# Patient Record
Sex: MALE | Race: WHITE | NOT HISPANIC OR LATINO | Employment: OTHER | URBAN - METROPOLITAN AREA
[De-identification: names, ages, dates, MRNs, and addresses within clinical notes are randomized per-mention and may not be internally consistent; named-entity substitution may affect disease eponyms.]

---

## 2017-08-11 ENCOUNTER — ALLSCRIPTS OFFICE VISIT (OUTPATIENT)
Dept: OTHER | Facility: OTHER | Age: 82
End: 2017-08-11

## 2017-08-11 DIAGNOSIS — E03.9 HYPOTHYROIDISM: ICD-10-CM

## 2017-10-04 ENCOUNTER — GENERIC CONVERSION - ENCOUNTER (OUTPATIENT)
Dept: OTHER | Facility: OTHER | Age: 82
End: 2017-10-04

## 2017-10-09 ENCOUNTER — GENERIC CONVERSION - ENCOUNTER (OUTPATIENT)
Dept: OTHER | Facility: OTHER | Age: 82
End: 2017-10-09

## 2017-10-24 ENCOUNTER — TRANSCRIBE ORDERS (OUTPATIENT)
Dept: ADMINISTRATIVE | Facility: HOSPITAL | Age: 82
End: 2017-10-24

## 2017-10-24 ENCOUNTER — HOSPITAL ENCOUNTER (OUTPATIENT)
Dept: RADIOLOGY | Facility: HOSPITAL | Age: 82
Discharge: HOME | End: 2017-10-24
Attending: PODIATRIST
Payer: COMMERCIAL

## 2017-10-24 DIAGNOSIS — I82.4Y2 DEEP VEIN THROMBOSIS (DVT) OF PROXIMAL VEIN OF LEFT LOWER EXTREMITY, UNSPECIFIED CHRONICITY (HCC): ICD-10-CM

## 2017-10-24 DIAGNOSIS — M79.89 SWELLING OF LIMB: ICD-10-CM

## 2017-10-24 DIAGNOSIS — R60.1 ANASARCA: ICD-10-CM

## 2017-10-24 DIAGNOSIS — R60.1 ANASARCA: Primary | ICD-10-CM

## 2017-10-24 PROCEDURE — 93971 EXTREMITY STUDY: CPT

## 2017-11-21 ENCOUNTER — GENERIC CONVERSION - ENCOUNTER (OUTPATIENT)
Dept: OTHER | Facility: OTHER | Age: 82
End: 2017-11-21

## 2017-11-23 LAB
T4 FREE SERPL-MCNC: 1.07 NG/DL (ref 0.82–1.77)
TSH SERPL DL<=0.05 MIU/L-ACNC: 5.08 UIU/ML (ref 0.45–4.5)

## 2017-11-28 ENCOUNTER — GENERIC CONVERSION - ENCOUNTER (OUTPATIENT)
Dept: OTHER | Facility: OTHER | Age: 82
End: 2017-11-28

## 2017-12-04 ENCOUNTER — ALLSCRIPTS OFFICE VISIT (OUTPATIENT)
Dept: OTHER | Facility: OTHER | Age: 82
End: 2017-12-04

## 2017-12-12 NOTE — PROGRESS NOTES
Assessment    1  Coronary arteriosclerosis (414 00) (I25 10)   2  Hypothyroidism (244 9) (E03 9)   3  BMI 31 0-31 9,adult (V85 31) (Z68 31)    Plan  Coronary arteriosclerosis    · AmLODIPine Besylate 5 MG Oral Tablet; TAKE 1 TABLET TWICE DAILY   · Aspir-81 81 MG Oral Tablet Delayed Release   · HydrALAZINE HCl - 10 MG Oral Tablet   · Toprol XL 50 MG Oral Tablet Extended Release 24 Hour (Metoprolol SuccinateER); 1 qd  Hypothyroidism    · Levothyroxine Sodium 112 MCG Oral Tablet; take 1 tablet by mouth once daily  Status post angioplasty    · Plavix 75 MG Oral Tablet (Clopidogrel Bisulfate); Take 1 tablet daily  Unlinked    · HydroCHLOROthiazide 25 MG Oral Tablet    Discussion/Summary  Possible side effects of new medications were reviewed with the patient/guardian today  The treatment plan was reviewed with the patient/guardian  The patient/guardian understands and agrees with the treatment plan      Chief Complaint  Patient here for review of recent labs  bh/lpn      Active Problems  1  Controlled diabetes mellitus (250 00) (E11 9)   2  Coronary arteriosclerosis (414 00) (I25 10)   3  Hypothyroidism (244 9) (E03 9)   4  Incisional hernia (553 21) (K43 2)   5  Influenza vaccination administered during current admission (V04 81) (Z23)   6  Medicare annual wellness visit, subsequent (V70 0) (Z00 00)   7  Need for influenza vaccination (V04 81) (Z23)   8  Need for vaccination with 13-polyvalent pneumococcal conjugate vaccine (V03 82) (Z23)   9  Osteoarthrosis of knee (715 36) (M17 10)   10  Status post angioplasty (V45 89) (Z98 62)   11  Wound, open, foot (892 0) (S91 309A)    Surgical History  1  History of Cholecystectomy    Family History  Mother    1  No pertinent family history  Father    2  No pertinent family history    Social History     · Daily caffeinated coffee consumption   · Drinks caffeinated tea   · Former smoker (V15 82) (F12 676)   · No alcohol use    Current Meds   1   AmLODIPine Besylate 5 MG Oral Tablet; TAKE 1 TABLET TWICE DAILY; Therapy: (Recorded:18Kjo1877) to Recorded   2  Aspir-81 81 MG Oral Tablet Delayed Release; Therapy: (Recorded:77Ikz2026) to Recorded   3  HydrALAZINE HCl - 10 MG Oral Tablet; Therapy: (Recorded:72Quo3947) to Recorded   4  HydroCHLOROthiazide 25 MG Oral Tablet; 1 qd; Therapy: 56KOQ2723 to  Requested for: 86WQH4764 Recorded   5  Levothyroxine Sodium 112 MCG Oral Tablet; take 1 tablet by mouth once daily; Therapy: 08DTN2502 to (Evaluate:24Aoj5113)  Requested for: 51AQD8173; Last Rx:52Vtq1287 Ordered   6  Plavix 75 MG Oral Tablet; Take 1 tablet daily; Therapy: (Recorded:29Csj9901) to Recorded   7  Toprol XL 50 MG Oral Tablet Extended Release 24 Hour; 1 qd; Therapy: 80NDW0164 to  Requested for: 32RLA1299 Recorded    Allergies  1  No Known Drug Allergies    Vitals  Vital Signs    Recorded: 85JLC9999 10:34AM   Temperature 97 F   Heart Rate 80   Respiration 12   Systolic 523   Diastolic 62   Height 5 ft 5 in   Weight 192 lb    BMI Calculated 31 95   BSA Calculated 1 94     Results/Data  PHQ-2 Adult Depression Screening 04IRJ1394 10:37AM User, [email or web address]     Test Name Result Flag Reference   PHQ-2 Adult Depression Score 0       Over the last two weeks, how often have you been bothered by any of the following problems? Little interest or pleasure in doing things: Not at all - 0 Feeling down, depressed, or hopeless: Not at all - 0   PHQ-2 Adult Depression Screening Negative       Falls Risk Assessment (Dx Z13 89 Screen for Neurologic Disorder) 78SMY0352 10:37AM User, [email or web address]     Test Name Result Flag Reference   Falls Risk      No falls in the past year       Future Appointments    Date/Time Provider Specialty Site   06/04/2018 10:30 AM PAULA Becker   Family Medicine [address]       Signatures   Electronically signed by : PAULA Silva ; Dec 11 2017  6:37AM EST                       (Author)

## 2018-01-12 NOTE — PROGRESS NOTES
Assessment    1  Medicare annual wellness visit, subsequent (V70 0) (Z00 00)    Discussion/Summary  Impression: Subsequent Annual Wellness Visit  Cardiovascular screening and counseling: the risks and benefits of screening were discussed  Diabetes screening and counseling: the risks and benefits of screening were discussed  Colorectal cancer screening and counseling: counseling was given on ways to eat a high fiber diet  Osteoporosis screening and counseling: counseling was given on obtaining adequate amounts of calcium and vitamin D on a daily basis and counseling was given on the importance of regular weightbearing exercise  Abdominal aortic aneurysm screening and counseling: the risks and benefits of screening were discussed  Glaucoma screening and counseling: screening is current  HIV screening and counseling: screening not indicated  Immunizations: influenza vaccine is up to date this year, the risks and benefits of pneumococcal vaccination were discussed with the patient, hepatitis B vaccination series is not indicated at this time due to the patient's low risk of mari the disease, the risks and benefits of the Zostavax vaccine were discussed with the patient, the risks and benefits of the Td vaccine were discussed with the patient and the risks and benefits of the Tdap vaccine were discussed with the patient  Advance Directive Planning: complete and up to date, he was encouraged to follow-up with me to discuss his questions and/or decisions  Patient Discussion: plan discussed with the patient, plan discussed with the patient's family, follow-up as needed  History of Present Illness  The patient is being seen for the subsequent annual wellness visit  Medicare Screening and Risk Factors   Hospitalizations: no previous hospitalizations     Once per lifetime medicare screening tests: Dr Skip Sharma Cardiologist   Medicare Screening Tests Risk Questions   Abdominal aortic aneurysm risk assessment: none indicated  Osteoporosis risk assessment: none indicated  HIV risk assessment: none indicated  Drug and Alcohol Use: The patient is a former cigarette smoker and 35 years ago  He has smoked for year(s)  The patient reports rare alcohol use  Alcohol concern:   The patient has no concerns about alcohol abuse  He has never used illicit drugs  Diet and Physical Activity: Current diet includes well balanced meals  He exercises daily  Exercise: walking  Mood Disorder and Cognitive Impairment Screening: Anxiety screening was done using 0  Depression screening was done using 0  He denies feeling down, depressed, or hopeless over the past two weeks  He denies feeling little interest or pleasure in doing things over the past two weeks  Cognitive impairment screening: denies difficulty learning/retaining new information, denies difficulty handling complex tasks, denies difficulty with reasoning, denies difficulty with spatial ability and orientation, denies difficulty with language and denies difficulty with behavior  Functional Ability/Level of Safety: Hearing is significantly decreased in the right ear and significantly decreased in the left ear  He does not use a hearing aid  The patient is currently able to do activities of daily living without limitations  Activities of daily living details: does not need help using the phone, no transportation help needed, does not need help shopping, no meal preparation help needed, does not need help doing housework, does not need help doing laundry, does not need help managing medications and does not need help managing money  Advance Directives: Advance directives: living will, durable power of  for health care directives, advance directives and At home     Co-Managers and Medical Equipment/Suppliers: See Patient Care Team      Patient Care Team    Care Team Member Role Specialty Office Number   [address] [place] Juliana Magy  Endocrinology [phone] MD  Ophthalmology [phone]     Review of Systems    Over the past 2 weeks, how often have you been bothered by the following problems? 1 ) Little interest or pleasure in doing things? Not at all    2 ) Feeling down, depressed or hopeless? Not at all    3 ) Trouble falling asleep or sleeping too much? Not at all    4 ) Feeling tired or having little energy? Not at all    5 ) Poor appetite or overeating? Not at all    6 ) Feeling bad about yourself, or that you are a failure, or have let yourself or your family down? Not at all    7 ) Trouble concentrating on things, such as reading a newspaper or watching television? Not at all    8 ) Moving or speaking so slowly that other people could have noticed, or the opposite, moving or speaking faster than usual? Not at all  How difficult have these problems made it for you to do your work, take care of things at home, or get along with people? Not at all  Active Problems    1  Controlled diabetes mellitus (250 00) (E11 9)   2  Coronary arteriosclerosis (414 00) (I25 10)   3  Hypothyroidism (244 9) (E03 9)   4  Incisional hernia (553 21) (K43 2)   5  Influenza vaccination administered during current admission (V04 81) (Z23)   6  Need for influenza vaccination (V04 81) (Z23)   7  Osteoarthritis, knee/lower leg (715 96) (M17 9)   8  Status post angioplasty (V45 89) (Z98 62)   9  Wound, open, foot (892 0) (S91 309A)    Past Medical History    The active problems and past medical history were reviewed and updated today  Surgical History    1  History of Cholecystectomy    The surgical history was reviewed and updated today  Family History  Mother    1  No pertinent family history  Father    2  No pertinent family history    The family history was reviewed and updated today         Social History    · Daily caffeinated coffee consumption   · Drinks caffeinated tea   · Former smoker (D17 26) (P27 967)   · No alcohol use  The social history was reviewed and updated today  Current Meds   1  AmLODIPine Besylate 5 MG Oral Tablet; TAKE 1 TABLET TWICE DAILY; Therapy: (Recorded:03Sep2013) to Recorded   2  Aspir-81 81 MG Oral Tablet Delayed Release; Therapy: (Recorded:11Aug2017) to Recorded   3  HydrALAZINE HCl - 10 MG Oral Tablet; Therapy: (Recorded:11Aug2017) to Recorded   4  HydroCHLOROthiazide 25 MG Oral Tablet; 1 qd; Therapy: 24DBQ1572 to  Requested for: 72XQZ5342 Recorded   5  Levothyroxine Sodium 88 MCG Oral Tablet; 1 qd; Therapy: 89UDL4537 to  Requested for: 66AUO1765 Recorded   6  Plavix 75 MG Oral Tablet; Take 1 tablet daily; Therapy: (Recorded:03Sep2013) to Recorded   7  Toprol XL 50 MG Oral Tablet Extended Release 24 Hour; 1 qd; Therapy: 12JJO8344 to  Requested for: 11JDF6365 Recorded    The medication list was reviewed and updated today  Allergies    1  No Known Drug Allergies    Immunizations  Influenza --- Tank Drummond: 29-Oct-2007  (77y); Series2: 23-Oct-2008  (78y); Series3: 26-Sep-2009   (79y); Series4: 30-Sep-2010  (80y); Series5: 23-Sep-2011  (81y); Series6: 25-Sep-2012  (82y); Series7: 03-Oct-2013  (83y); Series8: 14-Oct-2014  (84y); Series9: 04-Nov-2015  (85y); Edyqib76:  24-Oct-2016  (86y)   Zoster --- Tank Drummond: 13-Aug-2007  (76y)     Vitals  Signs   Recorded: 50QKE7629 03:38PM   Temperature: 98 F  Heart Rate: 76  Respiration: 12  Systolic: 911  Diastolic: 68  Height: 5 ft 5 in  Weight: 197 lb   BMI Calculated: 32 78  BSA Calculated: 1 97    Procedure    Procedure: Visual Acuity Test    Indication: routine screening  Inforrmation supplied by a Snellen chart  Results: 20/25 in both eyes without corrective device, 20/25 in the right eye without corrective device, 20/25 in the left eye without corrective device   Color vision was screened with the [place] Test and the results were        Signatures   Electronically signed by : PAULA Jang ; Aug 13 2017 11: 05PM EST                       (Author)

## 2018-01-14 VITALS
HEART RATE: 76 BPM | TEMPERATURE: 98 F | RESPIRATION RATE: 12 BRPM | BODY MASS INDEX: 32.82 KG/M2 | DIASTOLIC BLOOD PRESSURE: 68 MMHG | SYSTOLIC BLOOD PRESSURE: 124 MMHG | WEIGHT: 197 LBS | HEIGHT: 65 IN

## 2018-01-16 NOTE — RESULT NOTES
Verified Results  (LC) TSH Rfx on Abnormal to Free T4 17XQE6137 12:22PM Yajaira Click     Test Name Result Flag Reference   TSH 5 080 uIU/mL H 0 450-4 500   T4,Free (Direct) 1 07 ng/dL  0 82-1 77       Plan  Hypothyroidism    · Levothyroxine Sodium 100 MCG Oral Tablet   · Levothyroxine Sodium 112 MCG Oral Tablet; take 1 tablet by mouth once daily  Unlinked    · Levothyroxine Sodium 88 MCG Oral Tablet

## 2018-01-23 VITALS
WEIGHT: 192 LBS | DIASTOLIC BLOOD PRESSURE: 62 MMHG | HEIGHT: 65 IN | HEART RATE: 80 BPM | SYSTOLIC BLOOD PRESSURE: 124 MMHG | RESPIRATION RATE: 12 BRPM | BODY MASS INDEX: 31.99 KG/M2 | TEMPERATURE: 97 F

## 2018-02-13 DIAGNOSIS — E03.9 HYPOTHYROIDISM, UNSPECIFIED TYPE: Primary | ICD-10-CM

## 2018-02-14 RX ORDER — LEVOTHYROXINE SODIUM 112 UG/1
112 TABLET ORAL DAILY
Qty: 90 TABLET | Refills: 0 | Status: SHIPPED | OUTPATIENT
Start: 2018-02-14 | End: 2018-05-15 | Stop reason: SDUPTHER

## 2018-04-17 LAB — HBA1C MFR BLD HPLC: 6.5 %

## 2018-05-15 DIAGNOSIS — E03.9 HYPOTHYROIDISM, UNSPECIFIED TYPE: ICD-10-CM

## 2018-05-16 RX ORDER — LEVOTHYROXINE SODIUM 112 UG/1
112 TABLET ORAL DAILY
Qty: 90 TABLET | Refills: 0 | Status: SHIPPED | OUTPATIENT
Start: 2018-05-16 | End: 2018-05-19 | Stop reason: SDUPTHER

## 2018-05-19 DIAGNOSIS — E03.9 HYPOTHYROIDISM, UNSPECIFIED TYPE: ICD-10-CM

## 2018-05-19 RX ORDER — LEVOTHYROXINE SODIUM 112 UG/1
TABLET ORAL
Qty: 90 TABLET | Refills: 0 | Status: SHIPPED | OUTPATIENT
Start: 2018-05-19 | End: 2018-09-17 | Stop reason: SDUPTHER

## 2018-06-04 ENCOUNTER — OFFICE VISIT (OUTPATIENT)
Dept: FAMILY MEDICINE CLINIC | Facility: CLINIC | Age: 83
End: 2018-06-04
Payer: COMMERCIAL

## 2018-06-04 VITALS
RESPIRATION RATE: 18 BRPM | SYSTOLIC BLOOD PRESSURE: 110 MMHG | HEIGHT: 67 IN | WEIGHT: 180 LBS | DIASTOLIC BLOOD PRESSURE: 60 MMHG | BODY MASS INDEX: 28.25 KG/M2 | HEART RATE: 80 BPM

## 2018-06-04 DIAGNOSIS — I10 HYPERTENSION, UNSPECIFIED TYPE: Primary | ICD-10-CM

## 2018-06-04 DIAGNOSIS — E03.9 HYPOTHYROIDISM, UNSPECIFIED TYPE: ICD-10-CM

## 2018-06-04 DIAGNOSIS — E11.22 CONTROLLED TYPE 2 DIABETES MELLITUS WITH CHRONIC KIDNEY DISEASE, WITHOUT LONG-TERM CURRENT USE OF INSULIN, UNSPECIFIED CKD STAGE (HCC): ICD-10-CM

## 2018-06-04 DIAGNOSIS — N18.9 CHRONIC KIDNEY DISEASE, UNSPECIFIED CKD STAGE: ICD-10-CM

## 2018-06-04 PROCEDURE — 1160F RVW MEDS BY RX/DR IN RCRD: CPT | Performed by: FAMILY MEDICINE

## 2018-06-04 PROCEDURE — 3725F SCREEN DEPRESSION PERFORMED: CPT | Performed by: FAMILY MEDICINE

## 2018-06-04 PROCEDURE — 1036F TOBACCO NON-USER: CPT | Performed by: FAMILY MEDICINE

## 2018-06-04 PROCEDURE — 99214 OFFICE O/P EST MOD 30 MIN: CPT | Performed by: FAMILY MEDICINE

## 2018-06-04 PROCEDURE — 1101F PT FALLS ASSESS-DOCD LE1/YR: CPT | Performed by: FAMILY MEDICINE

## 2018-06-04 RX ORDER — CLOPIDOGREL BISULFATE 75 MG/1
TABLET ORAL
COMMUNITY
Start: 2018-04-24

## 2018-06-04 RX ORDER — METOPROLOL SUCCINATE 50 MG/1
TABLET, EXTENDED RELEASE ORAL
COMMUNITY
Start: 2018-05-02

## 2018-06-04 RX ORDER — GLUCOSAMINE HCL 500 MG
1000 TABLET ORAL
COMMUNITY

## 2018-06-04 RX ORDER — GLUCOSAM/CHON-MSM1/C/MANG/BOSW 500-416.6
TABLET ORAL
COMMUNITY
Start: 2018-04-20

## 2018-06-04 RX ORDER — SIMVASTATIN 10 MG
TABLET ORAL
COMMUNITY
Start: 2018-05-02

## 2018-06-04 RX ORDER — BLOOD SUGAR DIAGNOSTIC
STRIP MISCELLANEOUS
COMMUNITY
Start: 2018-04-24

## 2018-06-04 RX ORDER — LOSARTAN POTASSIUM 25 MG/1
25 TABLET ORAL
COMMUNITY
End: 2019-11-08 | Stop reason: SDUPTHER

## 2018-06-04 RX ORDER — HYDRALAZINE HYDROCHLORIDE 10 MG/1
10 TABLET, FILM COATED ORAL
COMMUNITY

## 2018-06-04 RX ORDER — AMLODIPINE BESYLATE 5 MG/1
TABLET ORAL
COMMUNITY
Start: 2018-04-11

## 2018-06-04 RX ORDER — TORSEMIDE 20 MG/1
TABLET ORAL
COMMUNITY
Start: 2018-05-08

## 2018-06-04 NOTE — PROGRESS NOTES
Assessment/Plan:  Diagnoses and all orders for this visit:    Hypertension, unspecified type  Comments:  BP low end normal-pt at times symptomaitic-t/c dec torsemide-pt will d/w cardio  at upcoming visit  Chronic kidney disease, unspecified CKD stage  Comments:  further inc in creat noted on last lab-t/c dec diuretic  Hypothyroidism, unspecified type  Comments:  cont  levothyroxine  Controlled type 2 diabetes mellitus with chronic kidney disease, without long-term current use of insulin, unspecified CKD stage ([facility] [address] )  Comments:  last NuD3O=1 5% in April  Cont t monitor  check yearly eye exam-Dr Claudia Dunham    t/c Shingrix vacc-will check w cardio prior t admin  Other orders  -     amLODIPine (NORVASC) 5 mg tablet;   -     aspirin 81 MG tablet; Take by mouth  -     Cholecalciferol (VITAMIN D3) 3000 units TABS; Take 1,000 Units by mouth  -     clopidogrel (PLAVIX) 75 mg tablet;   -     ACCU-CHEK ONEL PLUS test strip;   -     hydrALAZINE (APRESOLINE) 10 mg tablet; Take 10 mg by mouth  -     TRUEPLUS LANCETS 33G MISC;   -     losartan (COZAAR) 25 mg tablet; Take 25 mg by mouth  -     metoprolol succinate (TOPROL-XL) 50 mg 24 hr tablet;   -     simvastatin (ZOCOR) 10 mg tablet;   -     torsemide (DEMADEX) 20 mg tablet;   -     MAGNESIUM OXIDE PO; Take 400 mg by mouth                   Subjective:      Patient ID: Rita Byrne is a 80 y o  male  Chief Complaint   Patient presents with    Follow-up     6mth check hypothyroid,CAD       81 yo pt in for BP check and follow-up  BP low end normal-sometimes feels tired, dizzy-t/c dec diuretic-pt will d/w cardio-Dr Za Rudolph at upcoming visit  Vaccs UTD - t/c Shingrix-will d/w ok from [facility] prior to admin  UTD w eye-dr Roberts  Brought in labs done 5/29/18--noted inc BUN/creat,-?sec to change of diuretic to torsemide  YLA=805 8  Prior lab 4/17/18  BUN/creat/=37/1  95  LeY4P=6 5%  TSH=1 760  Lipidswnl except low HDL=37        The following portions of the patient's history were reviewed and updated as appropriate: allergies, current medications, past family history, past medical history, past social history, past surgical history and problem list      Review of Systems   Constitutional: Positive for fatigue  Negative for fever  Respiratory: Positive for chest tightness  Negative for shortness of breath  Cardiovascular: Negative for chest pain and palpitations  Hx CAD/HTN   Gastrointestinal:        Hx diverticular disease  Hx hemicolectomy     Genitourinary: Negative  Musculoskeletal: Positive for arthralgias and myalgias  Neurological: Negative  Psychiatric/Behavioral: Positive for sleep disturbance  Objective:    /60 (BP Location: Left arm, Patient Position: Sitting, Cuff Size: Standard)   Pulse 80   Resp 18   Ht 5' 7" (1 702 m)   Wt 81 6 kg (180 lb)   BMI 28 19 kg/m²        Physical Exam   Constitutional: He is oriented to person, place, and time  He appears well-developed and well-nourished  No distress  Chronically ill   HENT:   Mouth/Throat: No oropharyngeal exudate  Eyes: Conjunctivae are normal    Neck: Neck supple  Cardiovascular: Normal rate and regular rhythm  Murmur heard  Pulmonary/Chest: Effort normal and breath sounds normal    Abdominal: Soft  Bowel sounds are normal  There is no tenderness  Musculoskeletal: Normal range of motion  Neurological: He is alert and oriented to person, place, and time  No cranial nerve deficit  Skin: Skin is warm and dry  Psychiatric: He has a normal mood and affect  Nursing note and vitals reviewed  Labs;  Labs in chart were reviewed        Radha Moreland MD

## 2018-06-06 PROBLEM — N18.9 CHRONIC KIDNEY DISEASE: Status: ACTIVE | Noted: 2018-01-31

## 2018-06-06 PROBLEM — Z87.19 H/O DIVERTICULITIS OF COLON: Status: ACTIVE | Noted: 2018-01-31

## 2018-06-06 PROBLEM — K86.2 PANCREATIC CYST: Status: ACTIVE | Noted: 2018-01-31

## 2018-06-06 PROBLEM — I10 HYPERTENSION: Status: ACTIVE | Noted: 2018-01-31

## 2018-06-06 PROBLEM — Z90.49 S/P RIGHT HEMICOLECTOMY: Status: ACTIVE | Noted: 2018-01-31

## 2018-09-17 DIAGNOSIS — E03.9 HYPOTHYROIDISM, UNSPECIFIED TYPE: ICD-10-CM

## 2018-09-18 RX ORDER — LEVOTHYROXINE SODIUM 112 UG/1
TABLET ORAL
Qty: 90 TABLET | Refills: 0 | Status: SHIPPED | OUTPATIENT
Start: 2018-09-18 | End: 2018-11-19 | Stop reason: SDUPTHER

## 2018-10-18 ENCOUNTER — CLINICAL SUPPORT (OUTPATIENT)
Dept: CARDIAC REHAB | Facility: CLINIC | Age: 83
End: 2018-10-18
Payer: COMMERCIAL

## 2018-10-18 VITALS — HEIGHT: 67 IN | BODY MASS INDEX: 27.44 KG/M2 | WEIGHT: 174.8 LBS

## 2018-10-18 DIAGNOSIS — Z98.61 STATUS POST PERCUTANEOUS TRANSLUMINAL CORONARY ANGIOPLASTY: Primary | ICD-10-CM

## 2018-10-18 PROCEDURE — 93797 PHYS/QHP OP CAR RHAB WO ECG: CPT

## 2018-10-18 NOTE — PROGRESS NOTES
Cardiac Rehabilitation Plan of Care   Initial      Today's date: 10/18/2018   Visits: Initial  Patient name: Tre Reilly      : 1930  Age: 80 y o  MRN: [id number]  Referring Physician: Jerris Fothergill, MD  Provider: Missy Fernández  Clinician: Cher Rodriguez RN    Dx:   Encounter Diagnosis   Name Primary?  Status post percutaneous transluminal coronary angioplasty Yes     Date of onset: 10/5/2018    Comments: Completed initial evaluation    Medication compliance: Yes   Comments: states compliant  Fall Risk: Yes   Comments: chronic right hip pain, short of breath with minimal exertion    EXERCISE/ACTIVITY    Cardiovascular:   Min: 11   METS: 1 9   Hr: 98   RPE: 7   O2 sat: 98    Modalities: Treadmill, AD bike, UBE, NuStep and Recumbent bike  Strength trainin-3 days / week, 12-15 repitations  and 1-2 sets per modality    Modalities: Lateral raise, Arm curl and frontal raise shoulder shrugs upright rows  EKG changes: NSR at rest and with exercise  Dyspnea score: 2  Home activity: none  Goals: increase endurance to walk a longer distance than 650 feet in 6 minutes in 12 weeks without shortness of breath, increase strength  Education: Explained cardiac rehabilitation, cardiac risk factors, how the heart works, six minute walk test, arm curl and chair to stand test, target heart rates  Plan: nu-step 20 minutes level 3, UBE 6 minutes level 2, schwinn arm 5 minutes, increase time and resistance as tolerated     Readiness to change: 10    NUTRITION    Weight control:    Starting weight: 174 8   Current weight: Weight - Scale: 79 3 kg (174 lb 12 8 oz)   Waist circumference:    Startin   Current: Waist circumference (inches): 40 Inches  Diabetes: Patient reported fasting   Lipid management: Simvastatin, low fat diet  Goals: increase fish to twice a week, increase fruits and vegetables to 2 servings a day, beaware of sodium in diet  Education: sodium, rate your plate and 12% low fat diet  Plan:  increase fish to twice a week, increase fruits and vegetables to 2 servings a day, beaware of sodium in diet  Readiness to change: 10    PSYCHOSOCIAL    Emotional: PHQ-9 Total Score: 1  Self-reported stress level: 0   Social support: spouse and two daughters  Goals: continue to be stress free  Education: PHQ9  Plan: continue to be stress free  Readiness to change: 1    OTHER CORE COMPONENTS     Tobacco:   History   Smoking Status    Former Smoker   Smokeless Tobacco    Never Used     Blood pressure:    Resting: Resting BP: 142/60   Exercise:    Goals: decrease salt in diet and take medication as prescribed  Education: sodium  Plan: decrease sodium in diet and take medication as prescribed  Readiness to change: 10

## 2018-10-18 NOTE — PROGRESS NOTES
CARDIAC REHAB ASSESSMENT    Today's date: 2018  Patient name: Ion Pennington     : 1930       MRN: [id number]  PCP: Papito Garcia MD  Referring Physician: Ave Frye MD   Cardiologist: Ave Frye   Surgeon: N/A  Dx:   Encounter Diagnosis   Name Primary?  Status post percutaneous transluminal coronary angioplasty Yes       Date of onset: 10/5/2018  Cultural needs: none    Height:    Wt Readings from Last 1 Encounters:   10/18/18 79 3 kg (174 lb 12 8 oz)      Weight:   Ht Readings from Last 1 Encounters:   10/18/18 5' 7" (1 702 m)     Medical History: No past medical history on file  Physical Limitations: lifting restriction 10 pounds, chronic discomfort in right hip    Risk Factors   Cholesterol: Yes  Smoking: Former user  HTN: Yes  DM: Type 2   diet controlled  Obesity: Yes   Inactivity: Yes  Family History:  Family History   Problem Relation Age of Onset    No Known Problems Mother     No Known Problems Father        Allergies: Patient has no known allergies  ETOH:   History   Alcohol Use No         Current Medications:   Current Outpatient Prescriptions   Medication Sig Dispense Refill    ACCU-CHEK ONEL PLUS test strip       amLODIPine (NORVASC) 5 mg tablet       aspirin 81 MG tablet Take by mouth      Cholecalciferol (VITAMIN D3) 3000 units TABS Take 1,000 Units by mouth      clopidogrel (PLAVIX) 75 mg tablet       hydrALAZINE (APRESOLINE) 10 mg tablet Take 10 mg by mouth      levothyroxine 112 mcg tablet TAKE 1 TABLET EVERY DAY 90 tablet 0    losartan (COZAAR) 25 mg tablet Take 25 mg by mouth      MAGNESIUM OXIDE PO Take 400 mg by mouth      metoprolol succinate (TOPROL-XL) 50 mg 24 hr tablet       simvastatin (ZOCOR) 10 mg tablet       torsemide (DEMADEX) 20 mg tablet       TRUEPLUS LANCETS 33G MISC        No current facility-administered medications for this visit              Functional Status Prior to Diagnosis for Treatment   Occupation: retired  Recreation: reading  ADLs: chronic right hip discomfort, fatigue short of breath angina  [place]: No limitations  Exercise: none  Other: none    Current Functional Status  Occupation: retired  Recreation: reading  ADLs: chronic right hip discomfort, fatigue short of breath angina  [place]: No limitations  Exercise: none  Other: none      Short Term Program Goals: dietary modifications increase fish to twice a week, decrease sodium in diet and increase consumption of fruits and vegetables    Long Term Goals: Increase endurance to walk longer than 650 feet in 6 minutes in 12 weeks, increase energy    Ability to reach goals/rehabilitation potential:  Good    Projected return to function: 12 weeks  Objective tests: sub-max NuStep ETT  6 MWT  sit to stand  arm curl      Nutritional   Reviewed details of Rate your Plate  Discussed key elements of heart healthy eating  Reviewed patient goals for dietary modifications and their clinical implications  Reviewed most recent lipid profile       Goals for dietary modification: increase fish intake  increase fruits and vegetables  low sodium      Emotional/Social  Patient reports he/she is coping well with good social support and denies depression or anxiety    SOCIAL SUPPORT NETWORK    Marital status:     Rate 1-5:    Marriage: 0   Family: 0   Financial: 0   Relationships: 0   Spirituality: 0   Intellectual: 0    Perceived Stress: 0/10   Stressors:remain stress free   Goals for Stress Management:continue to be stress free    Domestic Violence Screening: he stated he was free of harm    Comments: completed initial evaluation

## 2018-10-18 NOTE — PROGRESS NOTES
Aida John   9/12/1930     Risk: moderate     Pre Post % Change Goal   Date: 10/18/18      Physical       Sub Max ETT (mets)nu step 1 9   10% increase   6MWT (feet) 650   10% increase   Arm Curl 20   5 pt decrease   Chair to stands 13      [employer] (est peak O2) 5 07      Peak exercise CR/IN (mets) 1 9   40% increase   Emotional       PHQ9 (> 10 refer to MD) 1   4 pt decrease   [place] (lower score = improvement)       Total 17   < 27   Feelings 1   < 3   Physical Fitness 4   < 3   Social Support 1   < 3   Daily Activities 3   < 3   Social Activities 1   < 3   Pain 1   < 3   Overall Health 3   < 3   Quality of Life 2   < 3   Change in Health 1   < 3   Dietary       Rate your plate 55   > 58   Measurements       Weight 174 5   2 5 - 5%   BMI 27 3   19 - 25   Waist Circ  40   < 40 M / < 35 F   % Body fat 19 2   < 25 M / < 33 F   BP left arm               (systolic) 898   < 562   (diastolic) 60   < 90   Smoking #/day  (if applicable) 0   0   Lipids/Glucose (Date)       Total cholesterol    50 - 200   Triglycerides    < 150   HDL    40 - 60   LDL    < 100   A1C    4 0 - 5 6%   Fasting BG

## 2018-10-19 ENCOUNTER — CLINICAL SUPPORT (OUTPATIENT)
Dept: FAMILY MEDICINE CLINIC | Facility: CLINIC | Age: 83
End: 2018-10-19
Payer: COMMERCIAL

## 2018-10-19 DIAGNOSIS — Z23 NEED FOR INFLUENZA VACCINATION: Primary | ICD-10-CM

## 2018-10-19 PROCEDURE — 90662 IIV NO PRSV INCREASED AG IM: CPT

## 2018-10-19 PROCEDURE — G0008 ADMIN INFLUENZA VIRUS VAC: HCPCS

## 2018-10-22 ENCOUNTER — CLINICAL SUPPORT (OUTPATIENT)
Dept: CARDIAC REHAB | Facility: CLINIC | Age: 83
End: 2018-10-22
Payer: COMMERCIAL

## 2018-10-22 DIAGNOSIS — Z98.61 POSTSURGICAL PERCUTANEOUS TRANSLUMINAL CORONARY ANGIOPLASTY STATUS: ICD-10-CM

## 2018-10-22 PROCEDURE — 93798 PHYS/QHP OP CAR RHAB W/ECG: CPT

## 2018-10-24 ENCOUNTER — CLINICAL SUPPORT (OUTPATIENT)
Dept: CARDIAC REHAB | Facility: CLINIC | Age: 83
End: 2018-10-24
Payer: COMMERCIAL

## 2018-10-24 DIAGNOSIS — Z98.61 POSTSURGICAL PERCUTANEOUS TRANSLUMINAL CORONARY ANGIOPLASTY STATUS: ICD-10-CM

## 2018-10-24 PROCEDURE — 93798 PHYS/QHP OP CAR RHAB W/ECG: CPT

## 2018-10-26 ENCOUNTER — CLINICAL SUPPORT (OUTPATIENT)
Dept: CARDIAC REHAB | Facility: CLINIC | Age: 83
End: 2018-10-26
Payer: COMMERCIAL

## 2018-10-26 DIAGNOSIS — Z98.61 POSTSURGICAL PERCUTANEOUS TRANSLUMINAL CORONARY ANGIOPLASTY STATUS: ICD-10-CM

## 2018-10-26 PROCEDURE — 93798 PHYS/QHP OP CAR RHAB W/ECG: CPT

## 2018-10-29 ENCOUNTER — CLINICAL SUPPORT (OUTPATIENT)
Dept: CARDIAC REHAB | Facility: CLINIC | Age: 83
End: 2018-10-29
Payer: COMMERCIAL

## 2018-10-29 DIAGNOSIS — Z98.61 POSTSURGICAL PERCUTANEOUS TRANSLUMINAL CORONARY ANGIOPLASTY STATUS: ICD-10-CM

## 2018-10-29 DIAGNOSIS — I50.22 CHRONIC SYSTOLIC HEART FAILURE (HCC): ICD-10-CM

## 2018-10-29 PROCEDURE — 93798 PHYS/QHP OP CAR RHAB W/ECG: CPT

## 2018-10-31 ENCOUNTER — CLINICAL SUPPORT (OUTPATIENT)
Dept: CARDIAC REHAB | Facility: CLINIC | Age: 83
End: 2018-10-31
Payer: COMMERCIAL

## 2018-10-31 DIAGNOSIS — Z98.61 POSTSURGICAL PERCUTANEOUS TRANSLUMINAL CORONARY ANGIOPLASTY STATUS: ICD-10-CM

## 2018-10-31 PROCEDURE — 93798 PHYS/QHP OP CAR RHAB W/ECG: CPT

## 2018-11-02 ENCOUNTER — CLINICAL SUPPORT (OUTPATIENT)
Dept: CARDIAC REHAB | Facility: CLINIC | Age: 83
End: 2018-11-02
Payer: COMMERCIAL

## 2018-11-02 DIAGNOSIS — Z98.61 S/P PTCA (PERCUTANEOUS TRANSLUMINAL CORONARY ANGIOPLASTY): ICD-10-CM

## 2018-11-02 PROCEDURE — 93798 PHYS/QHP OP CAR RHAB W/ECG: CPT

## 2018-11-05 ENCOUNTER — TELEPHONE (OUTPATIENT)
Dept: FAMILY MEDICINE CLINIC | Facility: CLINIC | Age: 83
End: 2018-11-05

## 2018-11-05 ENCOUNTER — CLINICAL SUPPORT (OUTPATIENT)
Dept: CARDIAC REHAB | Facility: CLINIC | Age: 83
End: 2018-11-05
Payer: COMMERCIAL

## 2018-11-05 DIAGNOSIS — Z98.61 S/P PTCA (PERCUTANEOUS TRANSLUMINAL CORONARY ANGIOPLASTY): ICD-10-CM

## 2018-11-05 PROCEDURE — 93798 PHYS/QHP OP CAR RHAB W/ECG: CPT

## 2018-11-06 ENCOUNTER — CLINICAL SUPPORT (OUTPATIENT)
Dept: FAMILY MEDICINE CLINIC | Facility: CLINIC | Age: 83
End: 2018-11-06
Payer: COMMERCIAL

## 2018-11-06 DIAGNOSIS — Z23 NEED FOR PNEUMOCOCCAL VACCINATION: Primary | ICD-10-CM

## 2018-11-06 PROCEDURE — G0009 ADMIN PNEUMOCOCCAL VACCINE: HCPCS

## 2018-11-06 PROCEDURE — 90732 PPSV23 VACC 2 YRS+ SUBQ/IM: CPT

## 2018-11-06 PROCEDURE — 4040F PNEUMOC VAC/ADMIN/RCVD: CPT

## 2018-11-06 NOTE — TELEPHONE ENCOUNTER
If pt had prevnar 13 on 8/11/2017 then he can get the pneum 23 if he did not already have done after age 72

## 2018-11-06 NOTE — TELEPHONE ENCOUNTER
Please clarify-per record pt had prevnar 13 8/11/2017    If this is the case then he can get pneum 23 now    The  2 diff pneum vaccines should be 6-12 mths apart

## 2018-11-07 ENCOUNTER — CLINICAL SUPPORT (OUTPATIENT)
Dept: CARDIAC REHAB | Facility: CLINIC | Age: 83
End: 2018-11-07
Payer: COMMERCIAL

## 2018-11-07 DIAGNOSIS — Z98.61 S/P PTCA (PERCUTANEOUS TRANSLUMINAL CORONARY ANGIOPLASTY): ICD-10-CM

## 2018-11-07 PROCEDURE — 93798 PHYS/QHP OP CAR RHAB W/ECG: CPT

## 2018-11-09 ENCOUNTER — APPOINTMENT (OUTPATIENT)
Dept: CARDIAC REHAB | Facility: CLINIC | Age: 83
End: 2018-11-09
Payer: COMMERCIAL

## 2018-11-12 ENCOUNTER — APPOINTMENT (OUTPATIENT)
Dept: CARDIAC REHAB | Facility: CLINIC | Age: 83
End: 2018-11-12
Payer: COMMERCIAL

## 2018-11-14 ENCOUNTER — APPOINTMENT (OUTPATIENT)
Dept: CARDIAC REHAB | Facility: CLINIC | Age: 83
End: 2018-11-14
Payer: COMMERCIAL

## 2018-11-14 NOTE — PROGRESS NOTES
Cardiac Rehabilitation Plan of Care   30 day       Today's date: 2018   Visits: 9  Patient name: Luisana Martinez      : 1930  Age: 80 y o  MRN: [id number]  Referring Physician: Shaylee Oseguera MD  Provider: Daniel Calderon  Clinician: Sage Rodriguez RN    Dx:   Encounter Diagnosis   Name Primary?  S/P PTCA (percutaneous transluminal coronary angioplasty)      Date of onset: 10/5/2018    Comments: Mr Magdi Singletary completed 9 sessions of the cardiac rehabilitation program  His telemetry monitor has been NSR at rest and with exercise  He completed 31 minutes of cardiovascular exercise  His exercise MET level increased from 1 9 to 2 2 MET's  He c/o chronic right hip pain  He missed several session due to the discomfort  His last attended session was on 2018Will continue to monitor  Medication compliance: Yes   Comments: states compliant  Fall Risk: Yes   Comments: chronic right hip pain, short of breath with minimal exertion    EXERCISE/ACTIVITY    Cardiovascular:   Min: 31   METS: 2 2   Hr: 88   RPE: 7   O2 sat: 98    Modalities: Treadmill, AD bike, UBE, NuStep and Recumbent bike  Strength trainin-3 days / week, 12-15 repitations  and 1-2 sets per modality    Modalities: Lateral raise, Arm curl and frontal raise shoulder shrugs upright rows  EKG changes: NSR at rest and with exercise  Dyspnea score: 2  Home activity: none  Goals: increase endurance to walk a longer distance than 650 feet in 6 minutes in 12 weeks without shortness of breath, increase strength  Education: Explained cardiac rehabilitation, cardiac risk factors, how the heart works, six minute walk test, arm curl and chair to stand test, target heart rates  Plan: nu-step 20 minutes level 3, UBE 6 minutes level 2, schwinn arm 5 minutes, increase time and resistance as tolerated     Readiness to change: 10    NUTRITION    Weight control:    Starting weight: 174 8   Current weight:   172  Waist circumference:    Starting: 40   Current:    Diabetes: Patient reported fasting   Lipid management: Simvastatin, low fat diet  Goals: increase fish to twice a week, increase fruits and vegetables to 2 servings a day, beaware of sodium in diet  Education: sodium, rate your plate and 86% low fat diet, healthy food choices  Plan:  increase fish to twice a week, increase fruits and vegetables to 2 servings a day, beaware of sodium in diet  Readiness to change: 10    PSYCHOSOCIAL    Emotional:    Self-reported stress level: 0   Social support: spouse and two daughters  Goals: continue to be stress free  Education: PHQ9  Plan: continue to be stress free  Readiness to change: 1    OTHER CORE COMPONENTS     Tobacco:   History   Smoking Status    Former Smoker   Smokeless Tobacco    Never Used     Blood pressure:    Resting: Resting BP: 112/52   Exercise:    Goals: decrease salt in diet and take medication as prescribed  Education: sodium, heart disease  Plan: decrease sodium in diet and take medication as prescribed  Readiness to change: 10

## 2018-11-16 ENCOUNTER — APPOINTMENT (OUTPATIENT)
Dept: CARDIAC REHAB | Facility: CLINIC | Age: 83
End: 2018-11-16
Payer: COMMERCIAL

## 2018-11-19 DIAGNOSIS — E03.9 HYPOTHYROIDISM, UNSPECIFIED TYPE: ICD-10-CM

## 2018-11-19 RX ORDER — LEVOTHYROXINE SODIUM 112 UG/1
TABLET ORAL
Qty: 90 TABLET | Refills: 0 | Status: SHIPPED | OUTPATIENT
Start: 2018-11-19 | End: 2019-01-25 | Stop reason: SDUPTHER

## 2018-11-21 ENCOUNTER — CLINICAL SUPPORT (OUTPATIENT)
Dept: CARDIAC REHAB | Facility: CLINIC | Age: 83
End: 2018-11-21
Payer: COMMERCIAL

## 2018-11-21 DIAGNOSIS — Z98.61 S/P PTCA (PERCUTANEOUS TRANSLUMINAL CORONARY ANGIOPLASTY): ICD-10-CM

## 2018-11-21 PROCEDURE — 93798 PHYS/QHP OP CAR RHAB W/ECG: CPT

## 2018-11-23 ENCOUNTER — APPOINTMENT (OUTPATIENT)
Dept: CARDIAC REHAB | Facility: CLINIC | Age: 83
End: 2018-11-23
Payer: COMMERCIAL

## 2018-11-26 ENCOUNTER — CLINICAL SUPPORT (OUTPATIENT)
Dept: CARDIAC REHAB | Facility: CLINIC | Age: 83
End: 2018-11-26
Payer: COMMERCIAL

## 2018-11-26 DIAGNOSIS — Z98.61 POST PTCA: ICD-10-CM

## 2018-11-26 PROCEDURE — 93798 PHYS/QHP OP CAR RHAB W/ECG: CPT

## 2018-11-28 ENCOUNTER — CLINICAL SUPPORT (OUTPATIENT)
Dept: CARDIAC REHAB | Facility: CLINIC | Age: 83
End: 2018-11-28
Payer: COMMERCIAL

## 2018-11-28 DIAGNOSIS — Z98.61 S/P PTCA (PERCUTANEOUS TRANSLUMINAL CORONARY ANGIOPLASTY): ICD-10-CM

## 2018-11-28 PROCEDURE — 93798 PHYS/QHP OP CAR RHAB W/ECG: CPT

## 2018-11-29 ENCOUNTER — CLINICAL SUPPORT (OUTPATIENT)
Dept: CARDIAC REHAB | Facility: CLINIC | Age: 83
End: 2018-11-29
Payer: COMMERCIAL

## 2018-11-29 DIAGNOSIS — Z98.61 POSTSURGICAL PERCUTANEOUS TRANSLUMINAL CORONARY ANGIOPLASTY STATUS: ICD-10-CM

## 2018-11-29 PROCEDURE — 93798 PHYS/QHP OP CAR RHAB W/ECG: CPT

## 2018-11-30 ENCOUNTER — APPOINTMENT (OUTPATIENT)
Dept: CARDIAC REHAB | Facility: CLINIC | Age: 83
End: 2018-11-30
Payer: COMMERCIAL

## 2018-12-03 ENCOUNTER — CLINICAL SUPPORT (OUTPATIENT)
Dept: CARDIAC REHAB | Facility: CLINIC | Age: 83
End: 2018-12-03
Payer: COMMERCIAL

## 2018-12-03 DIAGNOSIS — Z98.61 POSTSURGICAL PERCUTANEOUS TRANSLUMINAL CORONARY ANGIOPLASTY STATUS: ICD-10-CM

## 2018-12-03 PROCEDURE — 93798 PHYS/QHP OP CAR RHAB W/ECG: CPT

## 2018-12-05 ENCOUNTER — CLINICAL SUPPORT (OUTPATIENT)
Dept: CARDIAC REHAB | Facility: CLINIC | Age: 83
End: 2018-12-05
Payer: COMMERCIAL

## 2018-12-05 DIAGNOSIS — Z98.61 S/P PTCA (PERCUTANEOUS TRANSLUMINAL CORONARY ANGIOPLASTY): ICD-10-CM

## 2018-12-05 PROCEDURE — 93798 PHYS/QHP OP CAR RHAB W/ECG: CPT

## 2018-12-06 ENCOUNTER — CLINICAL SUPPORT (OUTPATIENT)
Dept: CARDIAC REHAB | Facility: CLINIC | Age: 83
End: 2018-12-06
Payer: COMMERCIAL

## 2018-12-06 DIAGNOSIS — Z98.61 POSTSURGICAL PERCUTANEOUS TRANSLUMINAL CORONARY ANGIOPLASTY STATUS: ICD-10-CM

## 2018-12-06 PROCEDURE — 93798 PHYS/QHP OP CAR RHAB W/ECG: CPT

## 2018-12-07 ENCOUNTER — APPOINTMENT (OUTPATIENT)
Dept: CARDIAC REHAB | Facility: CLINIC | Age: 83
End: 2018-12-07
Payer: COMMERCIAL

## 2018-12-07 NOTE — PROGRESS NOTES
Cardiac Rehabilitation Plan of Care   60 day      Today's date: 2018   Visits: 16  Patient name: Kaia Martinez      : 1930  Age: 80 y o  MRN: [id number]  Referring Physician: Juan F Lawson MD  Provider: [address] [place] [address]  Clinician: Ashwin Rodriguez RN    Dx:   Encounter Diagnosis   Name Primary?  Postsurgical percutaneous transluminal coronary angioplasty status      Date of onset: 10/5/2018    Comments: Mr Valarie Mascorro completed 16 sessions of the cardiac rehabilitation program  His telemetry monitor has been NSR at rest and with exercise  He completed 33 minutes of cardiovascular exercise  His exercise MET level increased from 1 9 to 2 3 MET's  He c/o chronic right hip pain but stated it is improving  He missed several session due to the discomfort  Will continue to monitor  Medication compliance: Yes   Comments: states compliant  Fall Risk: Yes   Comments: chronic right hip pain, short of breath with minimal exertion    EXERCISE/ACTIVITY    Cardiovascular:   Min: 33   METS: 2 3   Hr: 88   RPE: 4-5   O2 sat: 98    Modalities: Treadmill, AD bike, UBE, NuStep and Recumbent bike  Strength trainin-3 days / week, 12-15 repitations  and 1-2 sets per modality    Modalities: Lateral raise, Arm curl and frontal raise shoulder shrugs upright rows  EKG changes: NSR at rest and with exercise  Dyspnea score: 2  Home activity: none  Goals: increase endurance to walk a longer distance than 650 feet in 6 minutes in 12 weeks without shortness of breath, increase strength  Education: Explained cardiac rehabilitation, cardiac risk factors, how the heart works, six minute walk test, arm curl and chair to stand test, target heart rates  Plan: nu-step 20 minutes level 3, UBE 6 minutes level 2, schwinn arm 5 minutes, increase time and resistance as tolerated     Readiness to change: 10    NUTRITION    Weight control:    Starting weight: 174 8   Current weight:   172  Waist circumference:    Starting: 40   Current:    Diabetes: Patient reported fasting   Lipid management: Simvastatin, low fat diet  Goals: increase fish to twice a week, increase fruits and vegetables to 2 servings a day, beaware of sodium in diet  Education: sodium, rate your plate and 51% low fat diet, healthy food choices  Plan:  increase fish to twice a week, increase fruits and vegetables to 2 servings a day, beaware of sodium in diet  Readiness to change: 10    PSYCHOSOCIAL    Emotional:    Self-reported stress level: 0   Social support: spouse and two daughters  Goals: continue to be stress free  Education: PHQ9, stress management   Plan: continue to be stress free  Readiness to change: 1    OTHER CORE COMPONENTS     Tobacco:   History   Smoking Status    Former Smoker   Smokeless Tobacco    Never Used     Blood pressure:    Resting: Resting BP: 148/52   Exercise:    Goals: decrease salt in diet and take medication as prescribed  Education: sodium, heart disease  Plan: decrease sodium in diet and take medication as prescribed  Readiness to change: 10

## 2018-12-10 ENCOUNTER — CLINICAL SUPPORT (OUTPATIENT)
Dept: CARDIAC REHAB | Facility: CLINIC | Age: 83
End: 2018-12-10
Payer: COMMERCIAL

## 2018-12-10 DIAGNOSIS — Z98.61 POSTSURGICAL PERCUTANEOUS TRANSLUMINAL CORONARY ANGIOPLASTY STATUS: ICD-10-CM

## 2018-12-10 PROCEDURE — 93798 PHYS/QHP OP CAR RHAB W/ECG: CPT

## 2018-12-12 ENCOUNTER — CLINICAL SUPPORT (OUTPATIENT)
Dept: CARDIAC REHAB | Facility: CLINIC | Age: 83
End: 2018-12-12
Payer: COMMERCIAL

## 2018-12-12 DIAGNOSIS — Z98.61 S/P PERCUTANEOUS TRANSLUMINAL CORONARY ANGIOPLASTY: ICD-10-CM

## 2018-12-12 PROCEDURE — 93798 PHYS/QHP OP CAR RHAB W/ECG: CPT

## 2018-12-13 ENCOUNTER — CLINICAL SUPPORT (OUTPATIENT)
Dept: CARDIAC REHAB | Facility: CLINIC | Age: 83
End: 2018-12-13
Payer: COMMERCIAL

## 2018-12-13 DIAGNOSIS — Z98.61 POSTSURGICAL PERCUTANEOUS TRANSLUMINAL CORONARY ANGIOPLASTY STATUS: ICD-10-CM

## 2018-12-13 PROCEDURE — 93798 PHYS/QHP OP CAR RHAB W/ECG: CPT

## 2018-12-14 ENCOUNTER — APPOINTMENT (OUTPATIENT)
Dept: CARDIAC REHAB | Facility: CLINIC | Age: 83
End: 2018-12-14
Payer: COMMERCIAL

## 2018-12-17 ENCOUNTER — CLINICAL SUPPORT (OUTPATIENT)
Dept: CARDIAC REHAB | Facility: CLINIC | Age: 83
End: 2018-12-17
Payer: COMMERCIAL

## 2018-12-17 DIAGNOSIS — Z98.61 POST PTCA: ICD-10-CM

## 2018-12-17 PROCEDURE — 93798 PHYS/QHP OP CAR RHAB W/ECG: CPT

## 2018-12-19 ENCOUNTER — CLINICAL SUPPORT (OUTPATIENT)
Dept: CARDIAC REHAB | Facility: CLINIC | Age: 83
End: 2018-12-19
Payer: COMMERCIAL

## 2018-12-19 DIAGNOSIS — Z98.61 S/P PTCA (PERCUTANEOUS TRANSLUMINAL CORONARY ANGIOPLASTY): ICD-10-CM

## 2018-12-19 PROCEDURE — 93798 PHYS/QHP OP CAR RHAB W/ECG: CPT

## 2018-12-20 ENCOUNTER — CLINICAL SUPPORT (OUTPATIENT)
Dept: CARDIAC REHAB | Facility: CLINIC | Age: 83
End: 2018-12-20
Payer: COMMERCIAL

## 2018-12-20 DIAGNOSIS — Z98.61 PERCUTANEOUS TRANSLUMINAL CORONARY ANGIOPLASTY STATUS: ICD-10-CM

## 2018-12-20 PROCEDURE — 93798 PHYS/QHP OP CAR RHAB W/ECG: CPT

## 2018-12-21 ENCOUNTER — APPOINTMENT (OUTPATIENT)
Dept: CARDIAC REHAB | Facility: CLINIC | Age: 83
End: 2018-12-21
Payer: COMMERCIAL

## 2018-12-24 ENCOUNTER — CLINICAL SUPPORT (OUTPATIENT)
Dept: CARDIAC REHAB | Facility: CLINIC | Age: 83
End: 2018-12-24
Payer: COMMERCIAL

## 2018-12-24 DIAGNOSIS — Z98.61 POSTSURGICAL PERCUTANEOUS TRANSLUMINAL CORONARY ANGIOPLASTY STATUS: ICD-10-CM

## 2018-12-24 PROCEDURE — 93798 PHYS/QHP OP CAR RHAB W/ECG: CPT

## 2018-12-26 ENCOUNTER — CLINICAL SUPPORT (OUTPATIENT)
Dept: CARDIAC REHAB | Facility: CLINIC | Age: 83
End: 2018-12-26
Payer: COMMERCIAL

## 2018-12-26 DIAGNOSIS — Z98.61 POSTSURGICAL PERCUTANEOUS TRANSLUMINAL CORONARY ANGIOPLASTY STATUS: ICD-10-CM

## 2018-12-26 PROCEDURE — 93798 PHYS/QHP OP CAR RHAB W/ECG: CPT

## 2018-12-27 ENCOUNTER — APPOINTMENT (OUTPATIENT)
Dept: CARDIAC REHAB | Facility: CLINIC | Age: 83
End: 2018-12-27
Payer: COMMERCIAL

## 2018-12-28 ENCOUNTER — CLINICAL SUPPORT (OUTPATIENT)
Dept: CARDIAC REHAB | Facility: CLINIC | Age: 83
End: 2018-12-28
Payer: COMMERCIAL

## 2018-12-28 DIAGNOSIS — Z98.61 S/P PERCUTANEOUS TRANSLUMINAL CORONARY ANGIOPLASTY: ICD-10-CM

## 2018-12-28 PROCEDURE — 93798 PHYS/QHP OP CAR RHAB W/ECG: CPT

## 2018-12-31 ENCOUNTER — CLINICAL SUPPORT (OUTPATIENT)
Dept: CARDIAC REHAB | Facility: CLINIC | Age: 83
End: 2018-12-31
Payer: COMMERCIAL

## 2018-12-31 DIAGNOSIS — Z98.61 PERCUTANEOUS TRANSLUMINAL CORONARY ANGIOPLASTY STATUS: ICD-10-CM

## 2018-12-31 PROCEDURE — 93798 PHYS/QHP OP CAR RHAB W/ECG: CPT

## 2019-01-02 ENCOUNTER — CLINICAL SUPPORT (OUTPATIENT)
Dept: CARDIAC REHAB | Facility: CLINIC | Age: 84
End: 2019-01-02
Payer: COMMERCIAL

## 2019-01-02 DIAGNOSIS — Z98.61 POSTSURGICAL PERCUTANEOUS TRANSLUMINAL CORONARY ANGIOPLASTY STATUS: ICD-10-CM

## 2019-01-02 PROCEDURE — 93798 PHYS/QHP OP CAR RHAB W/ECG: CPT

## 2019-01-03 ENCOUNTER — APPOINTMENT (OUTPATIENT)
Dept: CARDIAC REHAB | Facility: CLINIC | Age: 84
End: 2019-01-03
Payer: COMMERCIAL

## 2019-01-04 ENCOUNTER — APPOINTMENT (OUTPATIENT)
Dept: CARDIAC REHAB | Facility: CLINIC | Age: 84
End: 2019-01-04
Payer: COMMERCIAL

## 2019-01-04 NOTE — PROGRESS NOTES
Cardiac Rehabilitation Plan of Care   90 day       Today's date: 2019   Visits: 27  Patient name: Augustin Kay      : 1930  Age: 80 y o  MRN: [id number]  Referring Physician: Sury Presley MD  Provider: Daniel Calderon  Clinician: Facundo Rodriguez RN    Dx:   Encounter Diagnosis   Name Primary?  Postsurgical percutaneous transluminal coronary angioplasty status      Date of onset: 10/5/2018    Comments: Mr Abrahan Saucedo completed 27 sessions of the cardiac rehabilitation program  His telemetry monitor has been NSR at rest and with exercise  He completed 33 minutes of cardiovascular exercise  His exercise MET level increased from 1 9 to 2 4 MET's  He c/o chronic right hip pain but stated it is improving  He missed several session due to the discomfort  Will continue to monitor  Medication compliance: Yes   Comments: states compliant  Fall Risk: Yes   Comments: chronic right hip pain, short of breath with minimal exertion    EXERCISE/ACTIVITY    Cardiovascular:   Min: 33   METS: 2 4   Hr: 88   RPE: 4-5   O2 sat: 98    Modalities: UBE and NuStep  Strength trainin-3 days / week, 12-15 repitations  and 1-2 sets per modality    Modalities: Lateral raise, Arm curl and frontal raise shoulder shrugs upright rows  EKG changes: NSR at rest and with exercise  Dyspnea score: 2  Home activity: none  Goals: increase endurance to walk a longer distance than 650 feet in 6 minutes in 12 weeks without shortness of breath, increase strength  Education: Explained cardiac rehabilitation, cardiac risk factors, how the heart works, six minute walk test, arm curl and chair to stand test, target heart rates  Plan: nu-step 20 minutes level 3, UBE 6 minutes level 2, schwinn arm 5 minutes, increase time and resistance as tolerated     Readiness to change: 10    NUTRITION    Weight control:    Starting weight: 174 8   Current weight:   172 5  Waist circumference:    Startin   Current:    Diabetes: Patient reported fasting   Lipid management: Simvastatin, low fat diet  Goals: increase fish to twice a week, increase fruits and vegetables to 2 servings a day, beaware of sodium in diet  Education: sodium, rate your plate and 46% low fat diet, healthy food choices  Plan:  increase fish to twice a week, increase fruits and vegetables to 2 servings a day, beaware of sodium in diet  Readiness to change: 10    PSYCHOSOCIAL    Emotional:    Self-reported stress level: 0   Social support: spouse and two daughters  Goals: continue to be stress free  Education: PHQ9, stress management   Plan: continue to be stress free  Readiness to change: 1    OTHER CORE COMPONENTS     Tobacco:   History   Smoking Status    Former Smoker   Smokeless Tobacco    Never Used     Blood pressure:    Resting: Resting BP: 138/50   Exercise:    Goals: decrease salt in diet and take medication as prescribed  Education: sodium, heart disease  Plan: decrease sodium in diet and take medication as prescribed  Readiness to change: 10

## 2019-01-07 ENCOUNTER — APPOINTMENT (OUTPATIENT)
Dept: CARDIAC REHAB | Facility: CLINIC | Age: 84
End: 2019-01-07
Payer: COMMERCIAL

## 2019-01-09 ENCOUNTER — APPOINTMENT (OUTPATIENT)
Dept: CARDIAC REHAB | Facility: CLINIC | Age: 84
End: 2019-01-09
Payer: COMMERCIAL

## 2019-01-11 ENCOUNTER — APPOINTMENT (OUTPATIENT)
Dept: CARDIAC REHAB | Facility: CLINIC | Age: 84
End: 2019-01-11
Payer: COMMERCIAL

## 2019-01-25 DIAGNOSIS — E03.9 HYPOTHYROIDISM, UNSPECIFIED TYPE: ICD-10-CM

## 2019-01-25 RX ORDER — LEVOTHYROXINE SODIUM 112 UG/1
TABLET ORAL
Qty: 90 TABLET | Refills: 0 | Status: SHIPPED | OUTPATIENT
Start: 2019-01-25 | End: 2019-04-02 | Stop reason: SDUPTHER

## 2019-04-02 DIAGNOSIS — E03.9 HYPOTHYROIDISM, UNSPECIFIED TYPE: ICD-10-CM

## 2019-04-02 RX ORDER — LEVOTHYROXINE SODIUM 112 UG/1
TABLET ORAL
Qty: 90 TABLET | Refills: 0 | Status: SHIPPED | OUTPATIENT
Start: 2019-04-02

## 2019-05-17 LAB
CREAT ?TM UR-SCNC: 68.6 UMOL/L
EXT PROTEIN URINE: 55.8
HBA1C MFR BLD HPLC: 6.4 %
PROT/CREAT UR: 813 MG/G{CREAT}

## 2019-06-03 DIAGNOSIS — E03.9 HYPOTHYROIDISM, UNSPECIFIED TYPE: ICD-10-CM

## 2019-06-03 RX ORDER — LEVOTHYROXINE SODIUM 112 UG/1
TABLET ORAL
Qty: 90 TABLET | Refills: 0 | OUTPATIENT
Start: 2019-06-03

## 2019-06-27 ENCOUNTER — OFFICE VISIT (OUTPATIENT)
Dept: FAMILY MEDICINE CLINIC | Facility: CLINIC | Age: 84
End: 2019-06-27
Payer: COMMERCIAL

## 2019-06-27 VITALS
HEIGHT: 67 IN | DIASTOLIC BLOOD PRESSURE: 60 MMHG | HEART RATE: 72 BPM | TEMPERATURE: 97.8 F | RESPIRATION RATE: 14 BRPM | SYSTOLIC BLOOD PRESSURE: 134 MMHG | BODY MASS INDEX: 27.31 KG/M2 | WEIGHT: 174 LBS

## 2019-06-27 DIAGNOSIS — Z00.00 ROUTINE MEDICAL EXAM: Primary | ICD-10-CM

## 2019-06-27 PROBLEM — N18.30 ANEMIA DUE TO STAGE 3 CHRONIC KIDNEY DISEASE (HCC): Status: ACTIVE | Noted: 2019-06-27

## 2019-06-27 PROBLEM — D63.1 ANEMIA DUE TO STAGE 3 CHRONIC KIDNEY DISEASE (HCC): Status: ACTIVE | Noted: 2019-06-27

## 2019-06-27 PROBLEM — Z98.890 H/O CAROTID ENDARTERECTOMY: Status: ACTIVE | Noted: 2018-10-02

## 2019-06-27 PROCEDURE — 1101F PT FALLS ASSESS-DOCD LE1/YR: CPT | Performed by: FAMILY MEDICINE

## 2019-06-27 PROCEDURE — G0439 PPPS, SUBSEQ VISIT: HCPCS | Performed by: FAMILY MEDICINE

## 2019-06-27 PROCEDURE — 1125F AMNT PAIN NOTED PAIN PRSNT: CPT | Performed by: FAMILY MEDICINE

## 2019-06-27 PROCEDURE — 1160F RVW MEDS BY RX/DR IN RCRD: CPT | Performed by: FAMILY MEDICINE

## 2019-06-27 PROCEDURE — 1036F TOBACCO NON-USER: CPT | Performed by: FAMILY MEDICINE

## 2019-06-27 PROCEDURE — 1170F FXNL STATUS ASSESSED: CPT | Performed by: FAMILY MEDICINE

## 2019-06-27 RX ORDER — ALLOPURINOL 100 MG/1
100 TABLET ORAL DAILY
COMMUNITY

## 2019-06-27 RX ORDER — FERROUS SULFATE 325(65) MG
325 TABLET ORAL DAILY
COMMUNITY

## 2019-06-27 RX ORDER — HYDROCHLOROTHIAZIDE 25 MG/1
25 TABLET ORAL DAILY
COMMUNITY

## 2019-09-10 ENCOUNTER — CLINICAL SUPPORT (OUTPATIENT)
Dept: FAMILY MEDICINE CLINIC | Facility: CLINIC | Age: 84
End: 2019-09-10
Payer: COMMERCIAL

## 2019-09-10 DIAGNOSIS — Z23 ENCOUNTER FOR IMMUNIZATION: Primary | ICD-10-CM

## 2019-09-10 PROCEDURE — 90662 IIV NO PRSV INCREASED AG IM: CPT

## 2019-09-10 PROCEDURE — G0008 ADMIN INFLUENZA VIRUS VAC: HCPCS

## 2019-11-08 DIAGNOSIS — I10 ESSENTIAL HYPERTENSION: Primary | ICD-10-CM

## 2019-11-08 RX ORDER — LOSARTAN POTASSIUM 25 MG/1
25 TABLET ORAL DAILY
Qty: 90 TABLET | Refills: 1 | Status: SHIPPED | OUTPATIENT
Start: 2019-11-08 | End: 2020-02-13

## 2020-01-31 ENCOUNTER — OFFICE VISIT (OUTPATIENT)
Dept: FAMILY MEDICINE CLINIC | Facility: CLINIC | Age: 85
End: 2020-01-31
Payer: COMMERCIAL

## 2020-01-31 VITALS
WEIGHT: 170.8 LBS | RESPIRATION RATE: 14 BRPM | TEMPERATURE: 96.7 F | SYSTOLIC BLOOD PRESSURE: 104 MMHG | BODY MASS INDEX: 26.81 KG/M2 | DIASTOLIC BLOOD PRESSURE: 50 MMHG | HEART RATE: 76 BPM | HEIGHT: 67 IN

## 2020-01-31 DIAGNOSIS — N62 GYNECOMASTIA, MALE: Primary | ICD-10-CM

## 2020-01-31 DIAGNOSIS — J90 CHRONIC PLEURAL EFFUSION: ICD-10-CM

## 2020-01-31 PROBLEM — I82.4Y2 DEEP VEIN THROMBOSIS (DVT) OF PROXIMAL VEIN OF LEFT LOWER EXTREMITY (HCC): Status: ACTIVE | Noted: 2020-01-31

## 2020-01-31 PROCEDURE — 99213 OFFICE O/P EST LOW 20 MIN: CPT | Performed by: FAMILY MEDICINE

## 2020-01-31 PROCEDURE — 1160F RVW MEDS BY RX/DR IN RCRD: CPT | Performed by: FAMILY MEDICINE

## 2020-02-01 PROBLEM — M10.9 GOUT: Status: ACTIVE | Noted: 2018-09-17

## 2020-02-01 PROBLEM — I21.A1 TYPE 2 MYOCARDIAL INFARCTION (HCC): Status: ACTIVE | Noted: 2019-09-04

## 2020-02-01 PROBLEM — D63.8 ANEMIA IN OTHER CHRONIC DISEASES CLASSIFIED ELSEWHERE: Status: ACTIVE | Noted: 2020-01-17

## 2020-02-01 PROBLEM — I50.9 CHF (CONGESTIVE HEART FAILURE) (HCC): Status: ACTIVE | Noted: 2019-09-04

## 2020-02-01 NOTE — PROGRESS NOTES
Chief Complaint   Patient presents with    Follow-up      CT for pleural effusion form [facility] w new finding left breast enlargement        Patient ID: Amaury Lin is a 80 y o  male  HPI    79 yo pt in for follow-up on recent CT Lung done -[facility]  Tests/clinical summary from pulm -Dr Ashish Medel reviewed  No sig change in pleural effusions--continued surveillance recommended  Incidental finding--new onset left gynecomastia  No personal/fhx breast ca  Pt w/o any sig pain, no nipple d/c   appt made w pt in office for left breast u/s to further eval   Pt w/o any other acute c/o    Caring for rqyu-Lbekr-b/p hip surgery-doing well  w help from [facility]    Past Medical History:   Diagnosis Date    Anemia due to stage 3 chronic kidney disease ([facility] )     Chronic kidney disease     Controlled diabetes mellitus ([facility] )     Coronary arteriosclerosis     H/O carotid endarterectomy     H/O diverticulitis of colon     Hypertension     Hypothyroid     Osteoarthritis     Pancreatic cyst        Past Surgical History:   Procedure Laterality Date    CHOLECYSTECTOMY      COLON SURGERY         Patient Active Problem List   Diagnosis    Chronic kidney disease    Controlled diabetes mellitus ([facility] )    Coronary arteriosclerosis    H/O diverticulitis of colon    Hypertension    Hypothyroidism    Osteoarthrosis of knee    Pancreatic cyst    S/P right hemicolectomy    H/O carotid endarterectomy    Anemia due to stage 3 chronic kidney disease (HCC)    Deep vein thrombosis (DVT) of proximal vein of left lower extremity (HCC)    Anemia in other chronic diseases classified elsewhere    CHF (congestive heart failure) (HCC)    Gout    Hyperlipidemia    Other transient cerebral ischemic attacks and related syndromes    Type 2 myocardial infarction (HCC)    Chronic kidney disease, stage 3 (moderate) (HCC)    Type 2 diabetes mellitus with complication (HCC)    Atherosclerotic heart disease of native coronary artery without angina pectoris    Essential (primary) hypertension       Family History   Problem Relation Age of Onset    No Known Problems Mother     No Known Problems Father        Immunization History   Administered Date(s) Administered    INFLUENZA 11/06/2018    Influenza Split High Dose Preservative Free IM 09/25/2012, 10/03/2013, 10/14/2014, 11/04/2015, 10/24/2016, 10/05/2017    Influenza TIV (IM) 10/29/2007, 10/23/2008, 09/26/2009, 09/30/2010, 09/23/2011    Influenza, high dose seasonal 0 5 mL 10/19/2018, 09/10/2019    Pneumococcal Conjugate 13-Valent 08/11/2017    Pneumococcal Polysaccharide PPV23 11/06/2018    Zoster 08/13/2007       No Known Allergies    Current Outpatient Medications   Medication Sig Dispense Refill    ACCU-CHEK ONEL PLUS test strip       allopurinol (ZYLOPRIM) 100 mg tablet Take 100 mg by mouth daily      amLODIPine (NORVASC) 5 mg tablet       aspirin 81 MG tablet Take by mouth      Cholecalciferol (VITAMIN D3) 3000 units TABS Take 1,000 Units by mouth      ferrous sulfate 325 (65 Fe) mg tablet Take 325 mg by mouth daily      levothyroxine 112 mcg tablet TAKE 1 TABLET EVERY DAY 90 tablet 0    losartan (COZAAR) 25 mg tablet Take 1 tablet (25 mg total) by mouth daily 90 tablet 1    MAGNESIUM OXIDE PO Take 400 mg by mouth      metoprolol succinate (TOPROL-XL) 50 mg 24 hr tablet       simvastatin (ZOCOR) 10 mg tablet       ticagrelor (BRILINTA) 90 MG Take 90 mg by mouth 2 (two) times a day      TRUEPLUS LANCETS 33G MISC       clopidogrel (PLAVIX) 75 mg tablet       hydrALAZINE (APRESOLINE) 10 mg tablet Take 10 mg by mouth      hydrochlorothiazide (HYDRODIURIL) 25 mg tablet Take 25 mg by mouth daily      torsemide (DEMADEX) 20 mg tablet        No current facility-administered medications for this visit          Social History     Socioeconomic History    Marital status: /Civil Union     Spouse name: None    Number of children: None    Years of education: None  Highest education level: None   Occupational History    None   Social Needs    Financial resource strain: None    Food insecurity:     Worry: None     Inability: None    Transportation needs:     Medical: None     Non-medical: None   Tobacco Use    Smoking status: Former Smoker    Smokeless tobacco: Never Used   Substance and Sexual Activity    Alcohol use: No    Drug use: None    Sexual activity: None   Lifestyle    Physical activity:     Days per week: None     Minutes per session: None    Stress: None   Relationships    Social connections:     Talks on phone: None     Gets together: None     Attends Mu-ism service: None     Active member of club or organization: None     Attends meetings of clubs or organizations: None     Relationship status: None    Intimate partner violence:     Fear of current or ex partner: None     Emotionally abused: None     Physically abused: None     Forced sexual activity: None   Other Topics Concern    None   Social History Narrative    Drinks caffeinated tea    Daily caffeinated coffee consumption       Review of Systems   Constitutional: Positive for fatigue  Respiratory:        RODRIGUEZ not at rest   Cardiovascular: Negative for chest pain  Genitourinary: Negative for dysuria  Musculoskeletal: Positive for arthralgias and myalgias  Skin: Negative for rash  Neurological: Negative  Psychiatric/Behavioral: Positive for sleep disturbance  Objective:    /50 (BP Location: Left arm, Patient Position: Sitting, Cuff Size: Large)   Pulse 76   Temp (!) 96 7 °F (35 9 °C)   Resp 14   Ht 5' 7" (1 702 m)   Wt 77 5 kg (170 lb 12 8 oz)   BMI 26 75 kg/m²        Physical Exam   Constitutional: He is oriented to person, place, and time  He appears well-developed and well-nourished  No distress  Chronically ill   HENT:   Mouth/Throat: No oropharyngeal exudate  Eyes: Conjunctivae are normal    Neck: Neck supple     Cardiovascular: Normal rate and regular rhythm  Murmur heard  Pulmonary/Chest: Effort normal and breath sounds normal  He exhibits tenderness (mild LACW -+firm, fixed, enlargement left medial, upper breast--no axillary or SC LAD apprec , no nipple d/c)  Abdominal: Soft  Bowel sounds are normal  There is no tenderness  Musculoskeletal: Normal range of motion  Neurological: He is alert and oriented to person, place, and time  No cranial nerve deficit  Skin: Skin is warm and dry  No rash noted  Psychiatric: He has a normal mood and affect  Nursing note and vitals reviewed  Assessment/Plan:   Diagnoses and all orders for this visit:    Gynecomastia, male  Comments:  new onset-incidental finding on CT--no personal or fhx breast ca--left breast ultrasound ordered-appt 2/3/2020-[facility] 10 am (pt req)  Orders:  -     US breast left limited (diagnostic); Future    Chronic pleural effusion  Comments:  cont  surveillance--follows [facility] puliggy -Dr Carmelina Jiménez  Other orders  -     Discontinue: ticagrelor (BRILINTA) 90 MG;  Take by mouth        Monica Mora MD

## 2020-02-05 ENCOUNTER — TELEPHONE (OUTPATIENT)
Dept: FAMILY MEDICINE CLINIC | Facility: CLINIC | Age: 85
End: 2020-02-05

## 2020-02-05 NOTE — TELEPHONE ENCOUNTER
Spoke w pt and pt's dtr--Gabbi (physician)-cell#[phone]  No suspicious findings assoc w the new left gynecomastia noted on left breast u/s  Will reach out to Dr Josh Lay radiologist at imaging ctr for recommendations on follow-up imaging

## 2020-02-05 NOTE — TELEPHONE ENCOUNTER
Pt of Dr Horacio Menjivar had u/s done on Monday at [facility] and is looking for the results

## 2020-02-07 NOTE — TELEPHONE ENCOUNTER
Spoke w dtr-Gabbi yesterday after speaking w Dr Domitila Cisneros findings assoc -rec surveillance unless change in symptoms

## 2020-02-07 NOTE — TELEPHONE ENCOUNTER
Please inform Gus (pt's dtr)-# below that I did also reach out to radiologist at [facility]) that unless pt exhibits any changes--eg nipple discharge, breast pain, etc- agrees w watchful waiting--rpt imaging in 3-6 mths

## 2020-02-12 NOTE — PROGRESS NOTES
Cardiac Rehabilitation Plan of Care   Discharge      Today's date: 2019   Visits: 27  Patient name: Shey French      : 1930  Age: 80 y o  MRN: [id number]  Referring Physician: Scar Monsivais MD  Provider: Shayna Solis  Clinician: Carlton Rodriguez RN    Dx:   Encounter Diagnosis   Name Primary?  Postsurgical percutaneous transluminal coronary angioplasty status      Date of onset: 10/5/2018    Comments:   Mr Gianna Florez called on 19 and stated he will not be returning to cardiac rehabilitation due to chronic discomfort of his lower extremities  Unable to complete outcome assessment due to patient did not return questionaires and return for discharge assessment  No change to previous not accept patient has been discharged from the program as per his request  He will continue to follow up with his MD  Mr Gianna Florez completed 27 sessions of the cardiac rehabilitation program  His telemetry monitor has been NSR at rest and with exercise  He completed 33 minutes of cardiovascular exercise  His exercise MET level increased from 1 9 to 2 4 MET's  He c/o chronic right hip pain but stated it is improving  He missed several session due to the discomfort       Medication compliance: Yes   Comments: states compliant  Fall Risk: Yes   Comments: chronic right hip pain, short of breath with minimal exertion    EXERCISE/ACTIVITY    Cardiovascular:   Min: 33   METS: 2 4   Hr: 88   RPE: 4-5   O2 sat: 98    Modalities: UBE and NuStep  Strength trainin-3 days / week, 12-15 repitations  and 1-2 sets per modality    Modalities: Lateral raise, Arm curl and frontal raise shoulder shrugs upright rows  EKG changes: NSR at rest and with exercise  Dyspnea score: 2  Home activity: none  Goals: increase endurance to walk a longer distance than 650 feet in 6 minutes in 12 weeks without shortness of breath, increase strength   Education: Explained cardiac rehabilitation, cardiac risk factors, how the heart works, six minute walk test, arm curl and chair to stand test, target heart rates  Plan: nu-step 20 minutes level 3, UBE 6 minutes level 2, schwinn arm 5 minutes, increase time and resistance as tolerated     Readiness to change: 10    NUTRITION    Weight control:    Starting weight: 174 8   Current weight:   172 5  Waist circumference:    Startin   Current:    Diabetes: Patient reported fasting   Lipid management: Simvastatin, low fat diet  Goals: increase fish to twice a week, increase fruits and vegetables to 2 servings a day, beaware of sodium in diet  Education: sodium, rate your plate and 74% low fat diet, healthy food choices  Plan:  increase fish to twice a week, increase fruits and vegetables to 2 servings a day, beaware of sodium in diet  Readiness to change: 10    PSYCHOSOCIAL    Emotional:    Self-reported stress level: 0   Social support: spouse and two daughters  Goals: continue to be stress free  Education: PHQ9, stress management   Plan: continue to be stress free  Readiness to change: 1    OTHER CORE COMPONENTS     Tobacco:   History   Smoking Status    Former Smoker   Smokeless Tobacco    Never Used     Blood pressure:    Resting: Resting BP: 138/50   Exercise:    Goals: decrease salt in diet and take medication as prescribed  Education: sodium, heart disease  Plan: decrease sodium in diet and take medication as prescribed  Readiness to change: 10 denies pain/discomfort

## 2020-02-13 DIAGNOSIS — I10 ESSENTIAL HYPERTENSION: ICD-10-CM

## 2020-02-13 RX ORDER — LOSARTAN POTASSIUM 25 MG/1
25 TABLET ORAL DAILY
Qty: 90 TABLET | Refills: 1 | Status: SHIPPED | OUTPATIENT
Start: 2020-02-13 | End: 2020-12-07

## 2020-08-04 ENCOUNTER — TELEPHONE (OUTPATIENT)
Dept: FAMILY MEDICINE CLINIC | Facility: CLINIC | Age: 85
End: 2020-08-04

## 2020-08-04 NOTE — TELEPHONE ENCOUNTER
Dr Lorine Skiff    Patient dropped off DMV placard form that he would like completed and mailed to [address] [place] [address] DMV in enclosed/stamped envleope  Patient is aware that Dr Lorine Skiff is not in the office at this time  Placed form and envelope in Dr Fab Noe folder

## 2020-08-08 ENCOUNTER — TELEPHONE (OUTPATIENT)
Dept: FAMILY MEDICINE CLINIC | Facility: CLINIC | Age: 85
End: 2020-08-08

## 2020-09-21 ENCOUNTER — CLINICAL SUPPORT (OUTPATIENT)
Dept: FAMILY MEDICINE CLINIC | Facility: CLINIC | Age: 85
End: 2020-09-21
Payer: COMMERCIAL

## 2020-09-21 VITALS — TEMPERATURE: 97.7 F

## 2020-09-21 DIAGNOSIS — Z23 NEED FOR INFLUENZA VACCINATION: Primary | ICD-10-CM

## 2020-09-21 PROCEDURE — 90662 IIV NO PRSV INCREASED AG IM: CPT

## 2020-09-21 PROCEDURE — G0008 ADMIN INFLUENZA VIRUS VAC: HCPCS

## 2020-12-06 DIAGNOSIS — I10 ESSENTIAL HYPERTENSION: ICD-10-CM

## 2020-12-07 RX ORDER — LOSARTAN POTASSIUM 25 MG/1
TABLET ORAL
Qty: 90 TABLET | Refills: 1 | Status: SHIPPED | OUTPATIENT
Start: 2020-12-07

## 2021-02-25 ENCOUNTER — TELEPHONE (OUTPATIENT)
Dept: FAMILY MEDICINE CLINIC | Facility: CLINIC | Age: 86
End: 2021-02-25

## 2021-02-25 NOTE — TELEPHONE ENCOUNTER
Recommendation is to wait 90 days since positive test as should have natural immunity  If mom did not test positive she can register for vaccine at this time  Simón Lewis

## 2021-02-25 NOTE — TELEPHONE ENCOUNTER
S/w Zahra Cash, discussed Dr Antonio Najera note he will have to wait 90 days for covid vaccine if wife Saad Coto didn't have covid she can get the vaccine   Told him I would notify person handling this in our office and they will call for appt for [facility]

## 2021-02-25 NOTE — TELEPHONE ENCOUNTER
Dr Arslan Hernandez pt daughter called to ask if her parenst Wendie Bearden and Rm Pelayo should get the vaccine given that dad was in hospital with antelmo

## 2021-03-12 ENCOUNTER — IMMUNIZATIONS (OUTPATIENT)
Dept: FAMILY MEDICINE CLINIC | Facility: HOSPITAL | Age: 86
End: 2021-03-12

## 2021-03-12 DIAGNOSIS — Z23 ENCOUNTER FOR IMMUNIZATION: Primary | ICD-10-CM

## 2021-03-12 PROCEDURE — 91301 SARS-COV-2 / COVID-19 MRNA VACCINE (MODERNA) 100 MCG: CPT

## 2021-03-12 PROCEDURE — 0011A SARS-COV-2 / COVID-19 MRNA VACCINE (MODERNA) 100 MCG: CPT

## 2021-04-12 ENCOUNTER — IMMUNIZATIONS (OUTPATIENT)
Dept: FAMILY MEDICINE CLINIC | Facility: HOSPITAL | Age: 86
End: 2021-04-12

## 2021-04-12 DIAGNOSIS — Z23 ENCOUNTER FOR IMMUNIZATION: Primary | ICD-10-CM

## 2021-04-12 PROCEDURE — 91301 SARS-COV-2 / COVID-19 MRNA VACCINE (MODERNA) 100 MCG: CPT

## 2021-04-12 PROCEDURE — 0012A SARS-COV-2 / COVID-19 MRNA VACCINE (MODERNA) 100 MCG: CPT

## 2021-10-12 ENCOUNTER — CLINICAL SUPPORT (OUTPATIENT)
Dept: FAMILY MEDICINE CLINIC | Facility: CLINIC | Age: 86
End: 2021-10-12
Payer: COMMERCIAL

## 2021-10-12 DIAGNOSIS — Z23 NEED FOR VACCINATION: Primary | ICD-10-CM

## 2021-10-12 PROCEDURE — 90662 IIV NO PRSV INCREASED AG IM: CPT

## 2021-10-12 PROCEDURE — G0008 ADMIN INFLUENZA VIRUS VAC: HCPCS

## 2021-11-10 ENCOUNTER — TELEPHONE (OUTPATIENT)
Dept: FAMILY MEDICINE CLINIC | Facility: CLINIC | Age: 86
End: 2021-11-10

## 2021-11-12 ENCOUNTER — RA CDI HCC (OUTPATIENT)
Dept: OTHER | Facility: HOSPITAL | Age: 86
End: 2021-11-12

## 2021-11-15 PROBLEM — I13.0 HYPERTENSIVE HEART AND CHRONIC KIDNEY DISEASE WITH HEART FAILURE AND STAGE 1 THROUGH STAGE 4 CHRONIC KIDNEY DISEASE, OR UNSPECIFIED CHRONIC KIDNEY DISEASE (HCC): Status: ACTIVE | Noted: 2021-11-15

## 2021-11-26 ENCOUNTER — OFFICE VISIT (OUTPATIENT)
Dept: FAMILY MEDICINE CLINIC | Facility: CLINIC | Age: 86
End: 2021-11-26
Payer: COMMERCIAL

## 2021-11-26 VITALS
HEART RATE: 78 BPM | TEMPERATURE: 97.4 F | HEIGHT: 66 IN | BODY MASS INDEX: 27.64 KG/M2 | DIASTOLIC BLOOD PRESSURE: 60 MMHG | OXYGEN SATURATION: 96 % | RESPIRATION RATE: 16 BRPM | WEIGHT: 172 LBS | SYSTOLIC BLOOD PRESSURE: 118 MMHG

## 2021-11-26 DIAGNOSIS — I35.0 NONRHEUMATIC AORTIC VALVE STENOSIS: ICD-10-CM

## 2021-11-26 DIAGNOSIS — I50.30 DIASTOLIC CONGESTIVE HEART FAILURE, UNSPECIFIED HF CHRONICITY (HCC): ICD-10-CM

## 2021-11-26 DIAGNOSIS — E78.2 MIXED HYPERLIPIDEMIA: ICD-10-CM

## 2021-11-26 DIAGNOSIS — D63.8 ANEMIA IN OTHER CHRONIC DISEASES CLASSIFIED ELSEWHERE: ICD-10-CM

## 2021-11-26 DIAGNOSIS — I25.10 CORONARY ARTERIOSCLEROSIS: ICD-10-CM

## 2021-11-26 DIAGNOSIS — Z00.00 MEDICARE ANNUAL WELLNESS VISIT, SUBSEQUENT: Primary | ICD-10-CM

## 2021-11-26 DIAGNOSIS — E03.9 HYPOTHYROIDISM, UNSPECIFIED TYPE: ICD-10-CM

## 2021-11-26 LAB
SL AMB  POCT GLUCOSE, UA: ABNORMAL
SL AMB LEUKOCYTE ESTERASE,UA: ABNORMAL
SL AMB POCT BILIRUBIN,UA: ABNORMAL
SL AMB POCT BLOOD,UA: ABNORMAL
SL AMB POCT CLARITY,UA: CLEAR
SL AMB POCT COLOR,UA: ABNORMAL
SL AMB POCT KETONES,UA: ABNORMAL
SL AMB POCT NITRITE,UA: ABNORMAL
SL AMB POCT PH,UA: 6
SL AMB POCT SPECIFIC GRAVITY,UA: 1
SL AMB POCT URINE PROTEIN: 30
SL AMB POCT UROBILINOGEN: ABNORMAL

## 2021-11-26 PROCEDURE — 3288F FALL RISK ASSESSMENT DOCD: CPT | Performed by: FAMILY MEDICINE

## 2021-11-26 PROCEDURE — 81003 URINALYSIS AUTO W/O SCOPE: CPT | Performed by: FAMILY MEDICINE

## 2021-11-26 PROCEDURE — 1160F RVW MEDS BY RX/DR IN RCRD: CPT | Performed by: FAMILY MEDICINE

## 2021-11-26 PROCEDURE — 99214 OFFICE O/P EST MOD 30 MIN: CPT | Performed by: FAMILY MEDICINE

## 2021-11-26 PROCEDURE — 1170F FXNL STATUS ASSESSED: CPT | Performed by: FAMILY MEDICINE

## 2021-11-26 PROCEDURE — 3725F SCREEN DEPRESSION PERFORMED: CPT | Performed by: FAMILY MEDICINE

## 2021-11-26 PROCEDURE — 1036F TOBACCO NON-USER: CPT | Performed by: FAMILY MEDICINE

## 2021-11-26 PROCEDURE — G0439 PPPS, SUBSEQ VISIT: HCPCS | Performed by: FAMILY MEDICINE

## 2021-11-26 PROCEDURE — 1125F AMNT PAIN NOTED PAIN PRSNT: CPT | Performed by: FAMILY MEDICINE

## 2021-11-26 RX ORDER — FUROSEMIDE 40 MG/1
TABLET ORAL
COMMUNITY
Start: 2021-03-25

## 2021-11-29 PROBLEM — E27.40 HYPOADRENALISM (HCC): Status: ACTIVE | Noted: 2021-03-01

## 2021-11-29 PROBLEM — I50.30 DIASTOLIC CHF (HCC): Status: ACTIVE | Noted: 2019-09-04

## 2021-11-29 PROBLEM — I35.0 NONRHEUMATIC AORTIC VALVE STENOSIS: Status: ACTIVE | Noted: 2021-03-26

## 2021-11-29 PROBLEM — I65.23 BILATERAL CAROTID ARTERY STENOSIS: Status: ACTIVE | Noted: 2021-03-26

## 2021-11-29 PROBLEM — U07.1 COVID-19: Status: ACTIVE | Noted: 2021-11-29

## 2022-07-28 ENCOUNTER — TELEPHONE (OUTPATIENT)
Dept: LAB | Facility: HOSPITAL | Age: 87
End: 2022-07-28

## 2022-10-12 PROBLEM — Z00.00 MEDICARE ANNUAL WELLNESS VISIT, SUBSEQUENT: Status: RESOLVED | Noted: 2021-11-26 | Resolved: 2022-10-12

## 2022-10-18 ENCOUNTER — CLINICAL SUPPORT (OUTPATIENT)
Dept: FAMILY MEDICINE CLINIC | Facility: CLINIC | Age: 87
End: 2022-10-18
Payer: COMMERCIAL

## 2022-10-18 DIAGNOSIS — Z23 ENCOUNTER FOR ADMINISTRATION OF VACCINE: Primary | ICD-10-CM

## 2022-10-18 PROCEDURE — 90662 IIV NO PRSV INCREASED AG IM: CPT

## 2022-10-18 PROCEDURE — G0008 ADMIN INFLUENZA VIRUS VAC: HCPCS

## 2023-01-10 ENCOUNTER — TELEPHONE (OUTPATIENT)
Dept: FAMILY MEDICINE CLINIC | Facility: CLINIC | Age: 88
End: 2023-01-10

## 2023-10-23 ENCOUNTER — IMMUNIZATIONS (OUTPATIENT)
Dept: FAMILY MEDICINE CLINIC | Facility: CLINIC | Age: 88
End: 2023-10-23
Payer: COMMERCIAL

## 2023-10-23 DIAGNOSIS — Z23 ENCOUNTER FOR IMMUNIZATION: Primary | ICD-10-CM

## 2023-10-23 PROCEDURE — G0008 ADMIN INFLUENZA VIRUS VAC: HCPCS

## 2023-10-23 PROCEDURE — 90662 IIV NO PRSV INCREASED AG IM: CPT

## 2023-12-06 ENCOUNTER — TRANSITIONAL CARE MANAGEMENT (OUTPATIENT)
Dept: FAMILY MEDICINE CLINIC | Facility: CLINIC | Age: 88
End: 2023-12-06

## 2024-02-02 ENCOUNTER — VBI (OUTPATIENT)
Dept: ADMINISTRATIVE | Facility: OTHER | Age: 89
End: 2024-02-02

## 2024-02-09 ENCOUNTER — TELEPHONE (OUTPATIENT)
Dept: FAMILY MEDICINE CLINIC | Facility: CLINIC | Age: 89
End: 2024-02-09

## 2024-02-09 NOTE — TELEPHONE ENCOUNTER
----- Message from Ryleigh Nemec sent at 2/6/2024  3:42 PM EST -----  Please schedule pt for AWV

## 2024-09-16 ENCOUNTER — TELEPHONE (OUTPATIENT)
Age: 89
End: 2024-09-16

## 2024-09-17 ENCOUNTER — TELEPHONE (OUTPATIENT)
Age: 89
End: 2024-09-17

## 2024-09-17 NOTE — TELEPHONE ENCOUNTER
Hiral with [facility] called received fax but page 2 is not signed. Please have Dr Baum sign page 2 and fax back both pages. Any questions Hiral can be reached at [phone]  
Please put in my purple folder-thanks  
all other ROS negative except as per HPI

## 2025-05-14 ENCOUNTER — TELEPHONE (OUTPATIENT)
Dept: FAMILY MEDICINE CLINIC | Facility: CLINIC | Age: OVER 89
End: 2025-05-14

## 2025-08-21 ENCOUNTER — TELEPHONE (OUTPATIENT)
Dept: FAMILY MEDICINE CLINIC | Facility: CLINIC | Age: OVER 89
End: 2025-08-21